# Patient Record
Sex: FEMALE | Race: WHITE | NOT HISPANIC OR LATINO | Employment: FULL TIME
[De-identification: names, ages, dates, MRNs, and addresses within clinical notes are randomized per-mention and may not be internally consistent; named-entity substitution may affect disease eponyms.]

---

## 2017-10-01 ENCOUNTER — HEALTH MAINTENANCE LETTER (OUTPATIENT)
Age: 27
End: 2017-10-01

## 2023-05-13 ENCOUNTER — OFFICE VISIT (OUTPATIENT)
Dept: URGENT CARE | Facility: URGENT CARE | Age: 33
End: 2023-05-13
Payer: COMMERCIAL

## 2023-05-13 VITALS
RESPIRATION RATE: 16 BRPM | WEIGHT: 188.8 LBS | HEART RATE: 89 BPM | TEMPERATURE: 96.9 F | SYSTOLIC BLOOD PRESSURE: 116 MMHG | OXYGEN SATURATION: 98 % | HEIGHT: 67 IN | BODY MASS INDEX: 29.63 KG/M2 | DIASTOLIC BLOOD PRESSURE: 76 MMHG

## 2023-05-13 DIAGNOSIS — J01.90 ACUTE SINUSITIS WITH COEXISTING CONDITION, NEED PROPHYLACTIC TREATMENT: Primary | ICD-10-CM

## 2023-05-13 DIAGNOSIS — R51.9 ACUTE NONINTRACTABLE HEADACHE, UNSPECIFIED HEADACHE TYPE: ICD-10-CM

## 2023-05-13 DIAGNOSIS — Z3A.31 31 WEEKS GESTATION OF PREGNANCY: ICD-10-CM

## 2023-05-13 PROCEDURE — 99203 OFFICE O/P NEW LOW 30 MIN: CPT | Performed by: PHYSICIAN ASSISTANT

## 2023-05-13 RX ORDER — FAMOTIDINE 20 MG/1
TABLET, FILM COATED ORAL
COMMUNITY
Start: 2023-05-09

## 2023-05-13 RX ORDER — AMOXICILLIN 875 MG
875 TABLET ORAL 2 TIMES DAILY
Qty: 20 TABLET | Refills: 0 | Status: SHIPPED | OUTPATIENT
Start: 2023-05-13 | End: 2023-05-23

## 2023-05-13 RX ORDER — CETIRIZINE HYDROCHLORIDE 10 MG/1
10 TABLET ORAL DAILY
COMMUNITY

## 2023-05-13 ASSESSMENT — PAIN SCALES - GENERAL: PAINLEVEL: MODERATE PAIN (4)

## 2023-05-13 NOTE — PROGRESS NOTES
Chief Complaint   Patient presents with     Sinus Problem     Sinus headache on left side for five days; patient does not feel that Tylenol takes pain away. Patient has been taking Zyrtec for past four days that has helped with post-nasal drip. Patient feels that ears are clogged.                  ASSESSMENT:       ICD-10-CM    1. Acute sinusitis with coexisting condition, need prophylactic treatment  J01.90 amoxicillin (AMOXIL) 875 MG tablet      2. Acute nonintractable headache, unspecified headache type  R51.9       3. 31 weeks gestation of pregnancy  Z3A.31 amoxicillin (AMOXIL) 875 MG tablet            PLAN: Acute sinusitis but limited on treatment with pregnancy.  Will place on amoxicillin twice daily for 10 days.  Continue Zyrtec as needed.  I have discussed clinical findings with patient.  Side effects of medications discussed.  Symptomatic care is discussed.  I have discussed the possibility of  worsening symptoms and indication to RTC or go to the ER if they occur.  All questions are answered, patient indicates understanding of these issues and is in agreement with plan.   Patient care instructions are discussed/given at the end of visit.   Lots of rest and fluids.      Melyssa Antonio PA-C      SUBJECTIVE:  33-year-old female with left-sided sinus pressure, sinus headache, left nasal congestion, postnasal drip and intermittent left ear popping for the past 5 days.  Zyrtec has helped slightly.  31 weeks pregnant.  Baby moving.  No abdominal pain or vaginal bleeding.  Tylenol is not helping.      No Known Allergies    Past Medical History:   Diagnosis Date     Scoliosis        cetirizine (ZYRTEC) 10 MG tablet, Take 10 mg by mouth daily  famotidine (PEPCID) 20 MG tablet,   PRENATAL VITAMINS PO, Take 1 tablet by mouth daily  VITAMIN D PO,     No current facility-administered medications on file prior to visit.      Social History     Tobacco Use     Smoking status: Never     Passive exposure: Never      "Smokeless tobacco: Never   Vaping Use     Vaping status: Not on file   Substance Use Topics     Alcohol use: No       ROS:  CONSTITUTIONAL: Negative for fatigue or fever.  EYES: Negative for eye problems.  ENT: As above.  RESP: As above.  CV: Negative for chest pains.  GI: Negative for vomiting.  MUSCULOSKELETAL:  Negative for significant muscle or joint pains.  NEUROLOGIC: Negative for headaches.  SKIN: Negative for rash.  PSYCH: Normal mentation for age.    OBJECTIVE:  /76 (BP Location: Left arm, Patient Position: Sitting, Cuff Size: Adult Regular)   Pulse 89   Temp 96.9  F (36.1  C) (Tympanic)   Resp 16   Ht 1.689 m (5' 6.5\")   Wt 85.6 kg (188 lb 12.8 oz)   SpO2 98%   Breastfeeding No   BMI 30.02 kg/m    GENERAL APPEARANCE: Healthy, alert and no distress.  EYES:Conjunctiva/sclera clear.  EARS: No cerumen.   Ear canals w/o erythema.  TM's intact w/o erythema.    NOSE/MOUTH: Nasal turbinates are erythematous and inflamed and friable looking on the left nares.  INUSES: No maxillary sinus tenderness.  THROAT: No erythema w/o tonsillar enlargement . No exudates.  NECK: Supple, nontender, no lymphadenopathy.  RESP: Lungs clear to auscultation - no rales, rhonchi or wheezes  CV: Regular rate and rhythm, normal S1 S2, no murmur noted.  NEURO: Awake, alert    SKIN: No rashes  TMJs nontender bilaterally with opening and closing mouth.      Melyssa Antonio PA-C    "